# Patient Record
Sex: FEMALE | NOT HISPANIC OR LATINO | URBAN - METROPOLITAN AREA
[De-identification: names, ages, dates, MRNs, and addresses within clinical notes are randomized per-mention and may not be internally consistent; named-entity substitution may affect disease eponyms.]

---

## 2017-07-24 ENCOUNTER — EMERGENCY (EMERGENCY)
Facility: HOSPITAL | Age: 32
LOS: 1 days | Discharge: HOME | End: 2017-07-24
Attending: EMERGENCY MEDICINE | Admitting: EMERGENCY MEDICINE
Payer: COMMERCIAL

## 2017-07-24 VITALS
OXYGEN SATURATION: 97 % | DIASTOLIC BLOOD PRESSURE: 64 MMHG | SYSTOLIC BLOOD PRESSURE: 115 MMHG | WEIGHT: 139.99 LBS | TEMPERATURE: 98 F | HEART RATE: 85 BPM | RESPIRATION RATE: 18 BRPM

## 2017-07-24 VITALS
RESPIRATION RATE: 18 BRPM | SYSTOLIC BLOOD PRESSURE: 122 MMHG | OXYGEN SATURATION: 99 % | DIASTOLIC BLOOD PRESSURE: 59 MMHG | TEMPERATURE: 98 F | HEART RATE: 99 BPM

## 2017-07-24 DIAGNOSIS — N75.1 ABSCESS OF BARTHOLIN'S GLAND: ICD-10-CM

## 2017-07-24 DIAGNOSIS — R11.0 NAUSEA: ICD-10-CM

## 2017-07-24 LAB
ALBUMIN SERPL ELPH-MCNC: 4.1 G/DL — SIGNIFICANT CHANGE UP (ref 3.3–5)
ALP SERPL-CCNC: 51 U/L — SIGNIFICANT CHANGE UP (ref 40–120)
ALT FLD-CCNC: 12 U/L — SIGNIFICANT CHANGE UP (ref 10–45)
ANION GAP SERPL CALC-SCNC: 12 MMOL/L — SIGNIFICANT CHANGE UP (ref 5–17)
APTT BLD: 30.8 SEC — SIGNIFICANT CHANGE UP (ref 27.5–37.4)
AST SERPL-CCNC: 12 U/L — SIGNIFICANT CHANGE UP (ref 10–40)
BASOPHILS NFR BLD AUTO: 0.4 % — SIGNIFICANT CHANGE UP (ref 0–2)
BILIRUB SERPL-MCNC: 0.2 MG/DL — SIGNIFICANT CHANGE UP (ref 0.2–1.2)
BLD GP AB SCN SERPL QL: NEGATIVE — SIGNIFICANT CHANGE UP
BUN SERPL-MCNC: 10 MG/DL — SIGNIFICANT CHANGE UP (ref 7–23)
CALCIUM SERPL-MCNC: 9.4 MG/DL — SIGNIFICANT CHANGE UP (ref 8.4–10.5)
CHLORIDE SERPL-SCNC: 101 MMOL/L — SIGNIFICANT CHANGE UP (ref 96–108)
CO2 SERPL-SCNC: 24 MMOL/L — SIGNIFICANT CHANGE UP (ref 22–31)
CREAT SERPL-MCNC: 0.6 MG/DL — SIGNIFICANT CHANGE UP (ref 0.5–1.3)
EOSINOPHIL NFR BLD AUTO: 2 % — SIGNIFICANT CHANGE UP (ref 0–6)
GLUCOSE SERPL-MCNC: 86 MG/DL — SIGNIFICANT CHANGE UP (ref 70–99)
HCT VFR BLD CALC: 37.4 % — SIGNIFICANT CHANGE UP (ref 34.5–45)
HGB BLD-MCNC: 12.6 G/DL — SIGNIFICANT CHANGE UP (ref 11.5–15.5)
INR BLD: 1.01 — SIGNIFICANT CHANGE UP (ref 0.88–1.16)
LYMPHOCYTES # BLD AUTO: 29.3 % — SIGNIFICANT CHANGE UP (ref 13–44)
MCHC RBC-ENTMCNC: 28.9 PG — SIGNIFICANT CHANGE UP (ref 27–34)
MCHC RBC-ENTMCNC: 33.7 G/DL — SIGNIFICANT CHANGE UP (ref 32–36)
MCV RBC AUTO: 85.8 FL — SIGNIFICANT CHANGE UP (ref 80–100)
MONOCYTES NFR BLD AUTO: 6.4 % — SIGNIFICANT CHANGE UP (ref 2–14)
NEUTROPHILS NFR BLD AUTO: 61.9 % — SIGNIFICANT CHANGE UP (ref 43–77)
PLATELET # BLD AUTO: 259 K/UL — SIGNIFICANT CHANGE UP (ref 150–400)
POTASSIUM SERPL-MCNC: 4.2 MMOL/L — SIGNIFICANT CHANGE UP (ref 3.5–5.3)
POTASSIUM SERPL-SCNC: 4.2 MMOL/L — SIGNIFICANT CHANGE UP (ref 3.5–5.3)
PROT SERPL-MCNC: 7.5 G/DL — SIGNIFICANT CHANGE UP (ref 6–8.3)
PROTHROM AB SERPL-ACNC: 11.2 SEC — SIGNIFICANT CHANGE UP (ref 9.8–12.7)
RBC # BLD: 4.36 M/UL — SIGNIFICANT CHANGE UP (ref 3.8–5.2)
RBC # FLD: 13.7 % — SIGNIFICANT CHANGE UP (ref 10.3–16.9)
RH IG SCN BLD-IMP: POSITIVE — SIGNIFICANT CHANGE UP
SODIUM SERPL-SCNC: 137 MMOL/L — SIGNIFICANT CHANGE UP (ref 135–145)
WBC # BLD: 10 K/UL — SIGNIFICANT CHANGE UP (ref 3.8–10.5)
WBC # FLD AUTO: 10 K/UL — SIGNIFICANT CHANGE UP (ref 3.8–10.5)

## 2017-07-24 PROCEDURE — 99284 EMERGENCY DEPT VISIT MOD MDM: CPT

## 2017-07-24 PROCEDURE — 86850 RBC ANTIBODY SCREEN: CPT

## 2017-07-24 PROCEDURE — 99283 EMERGENCY DEPT VISIT LOW MDM: CPT

## 2017-07-24 PROCEDURE — 85610 PROTHROMBIN TIME: CPT

## 2017-07-24 PROCEDURE — 86901 BLOOD TYPING SEROLOGIC RH(D): CPT

## 2017-07-24 PROCEDURE — 86900 BLOOD TYPING SEROLOGIC ABO: CPT

## 2017-07-24 PROCEDURE — 80053 COMPREHEN METABOLIC PANEL: CPT

## 2017-07-24 PROCEDURE — 85025 COMPLETE CBC W/AUTO DIFF WBC: CPT

## 2017-07-24 PROCEDURE — 85730 THROMBOPLASTIN TIME PARTIAL: CPT

## 2017-07-24 RX ORDER — IBUPROFEN 200 MG
600 TABLET ORAL ONCE
Qty: 0 | Refills: 0 | Status: COMPLETED | OUTPATIENT
Start: 2017-07-24 | End: 2017-07-24

## 2017-07-24 RX ORDER — AZTREONAM 2 G
1 VIAL (EA) INJECTION
Qty: 20 | Refills: 0 | OUTPATIENT
Start: 2017-07-24 | End: 2017-08-03

## 2017-07-24 RX ORDER — OXYCODONE AND ACETAMINOPHEN 5; 325 MG/1; MG/1
1 TABLET ORAL EVERY 4 HOURS
Qty: 0 | Refills: 0 | Status: DISCONTINUED | OUTPATIENT
Start: 2017-07-24 | End: 2017-07-24

## 2017-07-24 RX ADMIN — Medication 600 MILLIGRAM(S): at 19:46

## 2017-07-24 RX ADMIN — Medication 1 TABLET(S): at 19:47

## 2017-07-24 NOTE — ED ADULT TRIAGE NOTE - CHIEF COMPLAINT QUOTE
patient was sent in by her GYN Dr. Wayne for a Bartholin Cyst. she states that she has had it drained multiple times and now needs to have it taken out. denies fever/chills, changes in urine pattern. she has been taking motrin with no relief

## 2017-07-24 NOTE — ED PROVIDER NOTE - ATTENDING CONTRIBUTION TO CARE
32 F w recurrent bartholins abscess x 4 - had another develop a few days ago  no f/c no abd pain  vss  s1s2 lungs cta bl  abd soft nt nd +bs  ext no c/c/e  + bartholins cyst  IMP- Bartholins  s/p I and D by gyn

## 2017-07-24 NOTE — CONSULT NOTE ADULT - SUBJECTIVE AND OBJECTIVE BOX
33yo sent from GYN physician office for recurrent left bartholin cyst. Pt states she has had bartholin cysts/abscesses for the past 3 years. She states she has been getting them drained every 6 months or so. She states 2 yrs ago she had a word catheter placed after incision and drainage in [facility], and after that she has been been getting them drained.  Pt denies fever, chills, chest pain, SOB, abdominal pain, nausea, vomiting, vaginal bleeding, dysuria.     OB/GYN Hx: Pt denies hx of fibroids or cysts   PMHx: Bartholin cyst/abscess   SHx: Pt denies   Meds: Pt denies  NKDA     PHYSICAL EXAM:   Vital Signs Last 24 Hrs  T(C): 36.8 (24 Jul 2017 15:19), Max: 36.8 (24 Jul 2017 15:19)  T(F): 98.2 (24 Jul 2017 15:19), Max: 98.2 (24 Jul 2017 15:19)  HR: 69 (24 Jul 2017 15:19) (69 - 85)  BP: 112/74 (24 Jul 2017 15:19) (112/74 - 115/64)  BP(mean): --  RR: 18 (24 Jul 2017 15:19) (18 - 18)  SpO2: 98% (24 Jul 2017 15:19) (97% - 98%)    **************************  Constitutional: Alert & Oriented x3, No acute distress  Gastrointestinal: soft, non tender, positive bowel sounds, no rebound or guarding   Pelvic exam: left posterior 4cm fluctuant abscess- tender to touch posterior aspect   Extremities: no calf tenderness or swelling    LABS:                        12.6   10.0  )-----------( 259      ( 24 Jul 2017 15:39 )             37.4     07-24    137  |  101  |  10  ----------------------------<  86  4.2   |  24  |  0.60    Ca    9.4      24 Jul 2017 15:39    TPro  7.5  /  Alb  4.1  /  TBili  0.2  /  DBili  x   /  AST  12  /  ALT  12  /  AlkPhos  51  07-24    PT/INR - ( 24 Jul 2017 15:39 )   PT: 11.2 sec;   INR: 1.01        PTT - ( 24 Jul 2017 15:39 )  PTT:30.8 sec      A/P: 33yo sent from GYN physician office for recurrent left bartholin cyst. Pt's WBC 10, vital signs stable. Left posterior 4cm fluctuant abscess- tender to touch in posterior aspect. 33yo sent from GYN physician office for recurrent left bartholin cyst. Pt states she has had bartholin cysts/abscesses for the past 3 years. She states she has been getting them drained every 6 months or so. She states 2 yrs ago she had a word catheter placed after incision and drainage in [facility], and after that she has been been getting them drained. Pt states she can barely sit down without because she feels pain in the vulvar region.   Pt denies fever, chills, chest pain, SOB, abdominal pain, nausea, vomiting, vaginal bleeding, dysuria.     OB/GYN Hx: Pt denies hx of fibroids or cysts   PMHx: Bartholin cyst/abscess   SHx: Pt denies   Meds: Pt denies  NKDA     PHYSICAL EXAM:   Vital Signs Last 24 Hrs  T(C): 36.8 (24 Jul 2017 15:19), Max: 36.8 (24 Jul 2017 15:19)  T(F): 98.2 (24 Jul 2017 15:19), Max: 98.2 (24 Jul 2017 15:19)  HR: 69 (24 Jul 2017 15:19) (69 - 85)  BP: 112/74 (24 Jul 2017 15:19) (112/74 - 115/64)  BP(mean): --  RR: 18 (24 Jul 2017 15:19) (18 - 18)  SpO2: 98% (24 Jul 2017 15:19) (97% - 98%)    **************************  Constitutional: Alert & Oriented x3, No acute distress  Gastrointestinal: soft, non tender, positive bowel sounds, no rebound or guarding   Pelvic exam: left posterior 4cm fluctuant abscess- tender to touch posterior aspect   Extremities: no calf tenderness or swelling    LABS:                        12.6   10.0  )-----------( 259      ( 24 Jul 2017 15:39 )             37.4     07-24    137  |  101  |  10  ----------------------------<  86  4.2   |  24  |  0.60    Ca    9.4      24 Jul 2017 15:39    TPro  7.5  /  Alb  4.1  /  TBili  0.2  /  DBili  x   /  AST  12  /  ALT  12  /  AlkPhos  51  07-24    PT/INR - ( 24 Jul 2017 15:39 )   PT: 11.2 sec;   INR: 1.01        PTT - ( 24 Jul 2017 15:39 )  PTT:30.8 sec      A/P: 33yo sent from GYN physician office for recurrent left bartholin cyst. Pt's WBC 10, vital signs stable. Left posterior 4cm fluctuant abscess- tender to touch in posterior aspect.

## 2017-07-24 NOTE — ED PROVIDER NOTE - OBJECTIVE STATEMENT
31 y/o f with h/o recurrent bartholin cyst abscessx4. Patient presents to ED c/o same problem. Admit of pain, swelling, tenderness. Denies fever, n, v, abd pain, drainage. Was sent to ED by Dr. Alcantar from OB.

## 2017-07-24 NOTE — ED PROVIDER NOTE - CHPI ED SYMPTOMS NEG
no dysuria/no chills/no diarrhea/no fever/no burning urination/no abdominal distension/no vomiting/no nausea

## 2017-07-24 NOTE — ED PROVIDER NOTE - MEDICAL DECISION MAKING DETAILS
Patient with vaginal bartholin abscess was seen by gyn and I&D was done. Patient d/c with abx therapy and f/u with gyn.

## 2017-07-24 NOTE — CONSULT NOTE ADULT - ASSESSMENT
33 yo P0 w/ bartholin cyst abscess.  -Bedside incision and drainage performed, purulent wound drainage. Word catheter placed.   -DC patient on 7 d Bactrim DS BID  -Wound culture sent  -F/u in 1 week with patient's own gyn or with Dr. Coral Mead.    Patient seen and examined with Dr. Mead. I&D performed with Dr. Mead.

## 2021-06-07 ENCOUNTER — TRANSCRIPTION ENCOUNTER (OUTPATIENT)
Age: 36
End: 2021-06-07

## 2022-01-21 PROBLEM — Z00.00 ENCOUNTER FOR PREVENTIVE HEALTH EXAMINATION: Status: ACTIVE | Noted: 2022-01-21

## 2022-01-21 PROBLEM — N75.0 CYST OF BARTHOLIN'S GLAND: Chronic | Status: ACTIVE | Noted: 2017-07-24

## 2022-02-14 ENCOUNTER — NON-APPOINTMENT (OUTPATIENT)
Age: 37
End: 2022-02-14

## 2022-02-14 ENCOUNTER — APPOINTMENT (OUTPATIENT)
Dept: OBGYN | Facility: CLINIC | Age: 37
End: 2022-02-14
Payer: COMMERCIAL

## 2022-02-14 VITALS
WEIGHT: 162 LBS | OXYGEN SATURATION: 98 % | DIASTOLIC BLOOD PRESSURE: 60 MMHG | HEIGHT: 62.2 IN | HEART RATE: 82 BPM | BODY MASS INDEX: 29.43 KG/M2 | SYSTOLIC BLOOD PRESSURE: 100 MMHG

## 2022-02-14 DIAGNOSIS — Z78.9 OTHER SPECIFIED HEALTH STATUS: ICD-10-CM

## 2022-02-14 DIAGNOSIS — Z01.419 ENCOUNTER FOR GYNECOLOGICAL EXAMINATION (GENERAL) (ROUTINE) W/OUT ABNORMAL FINDINGS: ICD-10-CM

## 2022-02-14 PROCEDURE — 99395 PREV VISIT EST AGE 18-39: CPT

## 2022-02-14 NOTE — HISTORY OF PRESENT ILLNESS
[N] : Patient denies prior pregnancies [Normal Amount/Duration] :  normal amount and duration [Regular Cycle Intervals] : periods have been regular [Frequency: Q ___ days] : menstrual periods occur approximately every [unfilled] days [Menarche Age: ____] : age at menarche was [unfilled] [Menstrual Cramps] : menstrual cramps [Currently Active] : currently active [Men] : men [No] : No [Patient refuses STI testing] : Patient refuses STI testing [LMPDate] : 1/31/2022 [MensesLength] : 4-5 [MensesFreq] : 21 [FreeTextEntry1] : 1/31/2022

## 2022-02-19 LAB — CYTOLOGY CVX/VAG DOC THIN PREP: NORMAL

## 2024-01-05 NOTE — ED PROVIDER NOTE - CPE EDP HEME LYMPH NORM
Provide visual cue: yellow wristband, yellow gown, etc/Bed in lowest position, wheels locked, appropriate side rails in place/Call bell, personal items and telephone in reach/Physically safe environment - no spills, clutter or unnecessary equipment/Purposeful Proactive Rounding/Room/bathroom lighting operational, light cord in reach
- - -